# Patient Record
Sex: MALE | Race: WHITE
[De-identification: names, ages, dates, MRNs, and addresses within clinical notes are randomized per-mention and may not be internally consistent; named-entity substitution may affect disease eponyms.]

---

## 2018-11-12 ENCOUNTER — HOSPITAL ENCOUNTER (INPATIENT)
Dept: HOSPITAL 25 - ED | Age: 28
LOS: 7 days | Discharge: HOME | End: 2018-11-19
Attending: PSYCHIATRY & NEUROLOGY | Admitting: PSYCHIATRY & NEUROLOGY
Payer: COMMERCIAL

## 2018-11-12 DIAGNOSIS — R45.851: ICD-10-CM
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
## 2018-11-12 DIAGNOSIS — Z81.8: ICD-10-CM

## 2018-11-12 DIAGNOSIS — F10.24: Primary | ICD-10-CM

## 2018-11-12 DIAGNOSIS — F41.9: ICD-10-CM

## 2018-11-12 LAB
BASOPHILS # BLD AUTO: 0.1 10^3/UL (ref 0–0.2)
EOSINOPHIL # BLD AUTO: 0.1 10^3/UL (ref 0–0.6)
HCT VFR BLD AUTO: 48 % (ref 42–52)
HGB BLD-MCNC: 16.3 G/DL (ref 14–18)
LYMPHOCYTES # BLD AUTO: 1.1 10^3/UL (ref 1–4.8)
MCH RBC QN AUTO: 31 PG (ref 27–31)
MCHC RBC AUTO-ENTMCNC: 34 G/DL (ref 31–36)
MCV RBC AUTO: 92 FL (ref 80–94)
MONOCYTES # BLD AUTO: 0.9 10^3/UL (ref 0–0.8)
NEUTROPHILS # BLD AUTO: 8.7 10^3/UL (ref 1.5–7.7)
NRBC # BLD AUTO: 0 10^3/UL
NRBC BLD QL AUTO: 0.4
PLATELET # BLD AUTO: 225 10^3/UL (ref 150–450)
RBC # BLD AUTO: 5.2 10^6/UL (ref 4–5.4)
WBC # BLD AUTO: 10.9 10^3/UL (ref 3.5–10.8)

## 2018-11-12 PROCEDURE — 93005 ELECTROCARDIOGRAM TRACING: CPT

## 2018-11-12 PROCEDURE — 83036 HEMOGLOBIN GLYCOSYLATED A1C: CPT

## 2018-11-12 PROCEDURE — 90686 IIV4 VACC NO PRSV 0.5 ML IM: CPT

## 2018-11-12 PROCEDURE — 80053 COMPREHEN METABOLIC PANEL: CPT

## 2018-11-12 PROCEDURE — 84443 ASSAY THYROID STIM HORMONE: CPT

## 2018-11-12 PROCEDURE — 80329 ANALGESICS NON-OPIOID 1 OR 2: CPT

## 2018-11-12 PROCEDURE — 99284 EMERGENCY DEPT VISIT MOD MDM: CPT

## 2018-11-12 PROCEDURE — 80307 DRUG TEST PRSMV CHEM ANLYZR: CPT

## 2018-11-12 PROCEDURE — 81003 URINALYSIS AUTO W/O SCOPE: CPT

## 2018-11-12 PROCEDURE — 36415 COLL VENOUS BLD VENIPUNCTURE: CPT

## 2018-11-12 PROCEDURE — 80320 DRUG SCREEN QUANTALCOHOLS: CPT

## 2018-11-12 PROCEDURE — 90853 GROUP PSYCHOTHERAPY: CPT

## 2018-11-12 PROCEDURE — 80061 LIPID PANEL: CPT

## 2018-11-12 PROCEDURE — 85025 COMPLETE CBC W/AUTO DIFF WBC: CPT

## 2018-11-12 PROCEDURE — 99238 HOSP IP/OBS DSCHRG MGMT 30/<: CPT

## 2018-11-12 PROCEDURE — 99222 1ST HOSP IP/OBS MODERATE 55: CPT

## 2018-11-12 PROCEDURE — G0480 DRUG TEST DEF 1-7 CLASSES: HCPCS

## 2018-11-12 PROCEDURE — 99231 SBSQ HOSP IP/OBS SF/LOW 25: CPT

## 2018-11-12 NOTE — ED
Progress





- Progress Note


Progress Note: 


This patient was signed out to Dr. Alvarado, from Dr. Scanlon, pending dispo. MHE 

was done by Dr. Cheney at 2130 and the patient will be admitted with dx of 

depressive disorder. Patient understands and agrees with this plan.





- Consult/PCP


Time Called: 19:20





Re-Evaluation





- Re-Evaluation


  ** First Eval


Re-Evaluation Time: 13:40


Change: Unchanged


Comment: Pt is medically cleared for a MHE.





Course/Dx





- Diagnoses


Provider Diagnoses: 


 Depressive disorder








Discharge





- Sign-Out/Discharge


Documenting (check all that apply): Patient Departure - admit





- Discharge Plan


Condition: Stable


Disposition: PSYCHIATRIC FACILITY-[facility] [place]





- Billing Disposition and Condition


Condition: STABLE


Disposition: Psychiatric Facility [facility] [place]





- Attestation Statements


Document Initiated by Scribe: Yes


Documenting Scribe: Germain Prescott


Provider For Whom Scribe is Documenting (Include Credential): Aldo Alvarado MD


Scribe Attestation: 


Germain ANGEL, scribed for Aldo Alvarado MD on 11/16/18 at 0737. 


Scribe Documentation Reviewed: Yes


Provider Attestation: 


The documentation as recorded by the Germain barrett accurately reflects the 

service I personally performed and the decisions made by me, Aldo Alvarado MD

## 2018-11-12 NOTE — ED
Psychiatric Complaint





- HPI Summary


HPI Summary: 


Pt is a 27 y/o male who presents to the ED s/p suicide attempt. He states hes 

been going through a lot recently, including breaking up with his girlfriend. 

Pt has been drinking a lot, and states he cant sleep or work. Last night he 

drank a pint of whiskey, and tried to kill himself by hooking a hose up to the 

exhaust of his car. He states he couldnt go through with it entirely, and 

called his friend to take him to the ED for help. He tried to check into rehab 

for his alcoholism, but everything was closed because of the holiday weekend. 

Pt feels very anxious right now. He has gone through alcohol withdrawal before. 

Pt states he has been eating normally.








- History Of Current Complaint


Chief Complaint: EDMentalHealth


Time Seen by Provider: 11/12/18 13:29


Hx Obtained From: Patient


Onset/Duration: Gradual Onset, Lasting Days - Last night, Still Present


Character: Depressed, Anxious


Aggravating Factor(s): Recent Stress, Alcohol Use


Alleviating Factor(s): Nothing


Has Suicidal: Reports: Thoughts, With A Plan, Demonstrates Gesture


Has Homicidal: Denies: Thoughts





- Allergies/Home Medications


Allergies/Adverse Reactions: 


 Allergies











Allergy/AdvReac Type Severity Reaction Status Date / Time


 


No Known Allergies Allergy   Verified 11/12/18 13:26














PMH/Surg Hx/FS Hx/Imm Hx


Endocrine/Hematology History: 


   Denies: Hx Diabetes, Hx Thyroid Disease


Cardiovascular History: 


   Denies: Hx Hypertension


Respiratory History: 


   Denies: Hx Asthma, Hx Chronic Obstructive Pulmonary Disease (COPD)


GI History: 


   Denies: Hx Ulcer


Infectious Disease History: No


Infectious Disease History: 


   Denies: Hx Clostridium Difficile, Hx Hepatitis, Hx Human Immunodeficiency 

Virus (HIV), Hx of Known/Suspected MRSA, Hx Shingles, Hx Tuberculosis, Hx Known/

Suspected VRE, Hx Known/Suspected VRSA, History Other Infectious Disease, 

Traveled Outside the [place] in Last 30 Days





- Family History


Known Family History: 


   Negative: Other - alcoholism





- Social History


Alcohol Use: Daily


Hx Substance Use: Yes


Substance Use Type: Reports: Marijuana





Review of Systems


Negative: Fever


Positive: Anxious, Depressed, Other - suicide ideations and attempt


All Other Systems Reviewed And Are Negative: Yes





Physical Exam





- Summary


Physical Exam Summary: 


Appearance: Well appearing, no pain distress


Skin: warm, dry, flushing in face


Head/face: normal


Eyes: EOMI, JUDIE, no nystagmus


ENT: mucous membranes moist


Neck: supple, non-tender


Respiratory: CTA, breath sounds present


Cardiovascular: RRR, pulses symmetrical 


Abdomen: non-tender, soft


Bowel Sounds: present


Musculoskeletal: normal, strength/ROM intact


Neuro: normal, sensory motor intact, A&Ox3, fine tremor in hands


Psych: tearful, mildly agitated, SI, flat affect, doesnt maintain eye contact


Triage Information Reviewed: Yes


Vital Signs On Initial Exam: 


 Initial Vitals











Temp Pulse Resp BP Pulse Ox


 


 98.6 F   96   20   163/110   97 


 


 11/12/18 13:20  11/12/18 13:20  11/12/18 13:20  11/12/18 13:20  11/12/18 13:20











Vital Signs Reviewed: Yes





Diagnostics





- Vital Signs


 Vital Signs











  Temp Pulse Resp BP Pulse Ox


 


 11/12/18 13:20  98.6 F  96  20  163/110  97














- Laboratory


Result Diagrams: 


 11/12/18 13:50





 11/12/18 13:50


Lab Statement: Any lab studies that have been ordered have been reviewed, and 

results considered in the medical decision making process.





- EKG


  ** 13:51


Cardiac Rate: NL - 94 bpm


EKG Rhythm: Sinus Rhythm


Summary of EKG Findings: Nl axis, nl interval





Re-Evaluation





- Re-Evaluation


  ** First Eval


Re-Evaluation Time: 13:40


Change: Unchanged


Comment: Pt is medically cleared for a MHE.





Course/Dx





- Course


Course Of Treatment: Patient sobered in the ER and was cleared for mental 

health evaluation.  He was pending disposition by crisis at time of sign out to 

oncoming ER physician.  Patient likely will be admitted.





- Differential Dx/Clinical Impression


Differential Diagnosis/HQI/PQRI: Positive: Acute Psychosis, Alcohol Intoxication

, Anxiety, Bipolar Disorder, Depression, Suicidal Ideation, Suicidal Gesture


Provider Diagnosis: 


 Depression, Suicide gesture








Discharge





- Sign-Out/Discharge


Documenting (check all that apply): Sign-Out Patient


Signing out patient TO: Aldo Alvarado





- Discharge Plan


Condition: Stable


Referrals: 


No Primary Care Phys,NOPCP [Primary Care Provider] - 





- Billing Disposition and Condition


Condition: STABLE





- Attestation Statements


Document Initiated by Scribe: Yes


Documenting Scribe: Adriana Guajardo


Provider For Whom Scribe is Documenting (Include Credential): Darwin Scanlon MD


Scribe Attestation: 


I, Adriana Guajardo, scribed for Darwin Scanlon MD on 11/12/18 at 1923. 


Scribe Documentation Reviewed: Yes


Provider Attestation: 


The documentation as recorded by the scribe, Adriana Guajardo accurately reflects the 

service I personally performed and the decisions made by me, Darwin Scanlon MD

## 2018-11-13 PROCEDURE — GZHZZZZ GROUP PSYCHOTHERAPY: ICD-10-PCS | Performed by: PSYCHIATRY & NEUROLOGY

## 2018-11-13 RX ADMIN — LORAZEPAM SCH MG: 1 TABLET ORAL at 11:21

## 2018-11-13 RX ADMIN — THERA TABS SCH TAB: TAB at 11:21

## 2018-11-13 RX ADMIN — FOLIC ACID SCH MG: 1 TABLET ORAL at 11:21

## 2018-11-13 RX ADMIN — MIRTAZAPINE SCH MG: 15 TABLET, FILM COATED ORAL at 20:27

## 2018-11-13 RX ADMIN — THERA TABS SCH TAB: TAB at 08:55

## 2018-11-13 RX ADMIN — FOLIC ACID SCH MG: 1 TABLET ORAL at 08:55

## 2018-11-13 NOTE — PN
MHU: Group Therapy Note





- Service Type


Service Type: 71545 Group Psychotherapy - Cognitive Behavioral Group Therapy (

CBT):Patient attended CBT programming this morning and presented with flat 

affect that did not vary with discussion.  Although responsive to direct 

prompts to respond to questions, patient did not engage in spontaneous 

conversation.

## 2018-11-13 NOTE — HP
H&P (Free Text)


History and Physical: 





JUSTIFICATION FOR ADMISSION:


Patient presented to emergency room with suicidal ideation and plan, worsening 

depression and alcoholism, recent break up.  He requires inpatient psychiatric 

admission in order to provide treatment and stabilization as he is a danger to 

himself.  





CHIEF COMPLAINT: "I was having suicidal thoughts and was not feeling safe and 

called my friend





HISTORY OF THE PRESENT ILLNESS:


Patient is a 29 y/o male, single, living with room mates, employed, with 

history of Alcohol Use Disorder. Patient has has not been in any psychiatric or 

substance abuse treatment. Patient reports that symptoms worsened when she came 

home drunk with last week with another male friend. Patient reportedly has been 

struggling since then and working to resolve conflict and recent decision of 

breaking up with his girl friend. Patient was admitted to inpatient unit for 

worsening of his depression, decrease appetites, disturbed sleep, inability to 

concentrate, anhedonia, drinking 3-10 drinks of alcohol daily. Patient 

reportedly under the influence of alcohol planned how he was going to die from 

suicide by carbon monoxide poisoning. Patient was making arrangement to but due 

to alcohol intoxication was unable to carry it out. Patient next morning 

continue to have suicidal thoughts and was feeling unsafe hence called his 

friend. Patient's friend brought patient to E.D for evaluation.  Patient 

reports no manic symptoms. Patient reports no psychotic symptoms.   Patient 

denied any suicidal or homicidal ideation on the unit. Patient continued to 

exhibit behavior that was in control and feels safe at the hospital. Patient 

continues to report depression and anxiety and was feeling intensely worries 

this morning with his current situation.     





PAST PSYCHIATRIC HISTORY:


Patient has history of no inpatient psychiatric hospitalization.  Patient has 

history of no outpatient psychiatric treatment. Patients reports no medication 

trial.   .Patient has been in no inpatient or outpatient drug treatment.  

Patient has history of suicidal thoughts in the past during divorce about 

couple of years ago but no intent or attempt. Patient has history of no 

homicidal threat, no intent or attempt. Patient reports no history of 

aggressive and agitated behavior when decompensates. No access to firearm 

reported. Patient reports no physical, emotional or sexual abuse. 





SUBSTANCE ABUSE HISTORY:


Patient uses alcohol on a daily basis since his teenage years. Patient has 

history of DWI during teenage years. Patient has been associated and working in 

wine industry. Patient now a days has been consuming 3-10 drinks of wine daily. 

Patient last use was sunday night, a pint of wine. BAL was 124. Patient denied 

any substance abuse  but urine toxicology was positive for cannabis. Patient 

has been in no inpatient and outpatient treatment for drugs but is willing to 

follow up with inpatient rehab. 





PAST MEDICAL HISTORY: No active medical problems





ALLERGIES: NKA





FAMILY PSYCHIATRIC HISTORY:


Patient reports family history of brother with depression, who was hospitalized 

at a point in his life. But patient not aware of treatment and has limited 

communication with him. Patient reports no history of substance abuse in 

family. No reported suicide in the family.





FAMILY/PSYCHOSOCIAL HISTORY:


Patient currently lives with his room mate. Patient reports that he was in 

relationship a room mate that he broke up with recently. Patient reports 

redefining her relationship after previous break up with her. Patient reported 

being in open relationship with her before this recent break up. Patient was 

 for eight year until divorce that happened couple of yeas ago.  Patient 

has no children. Patient education level is Bachelors. Patient was raised by 

both his parents which he has some level of communication every other week over 

the phone and physically meets them every month. Patient reports memories of 

his parents fighting over issues including finances etc that he still recalls 

at times during his child hightower . Patient support system includes friends and 

families.





REVIEW OF SYSTEMS:  


Patients review of symptoms was negative for any physical complaint. Patient 

is currently on [facility] for detox from Alcohol and vitals has been improving. 

Patients ED physical exam was reviewed which is grossly normal with no active 

medical problem.





Physical Exam Summary: 


Appearance: Well appearing, no pain distress


Skin: warm, dry, flushing in face


Head/face: normal


Eyes: EOMI, JUDIE, no nystagmus


ENT: mucous membranes moist


Neck: supple, non-tender


Respiratory: CTA, breath sounds present


Cardiovascular: RRR, pulses symmetrical 


Abdomen: non-tender, soft


Bowel Sounds: present


Musculoskeletal: normal, strength/ROM intact


Neuro: normal, sensory motor intact, A&Ox3, fine tremor in hands


Psych: tearful, mildly agitated, SI, flat affect, doesnt maintain eye contact





MENTAL STATUS EXAMINATION:


Appearance: 29 y/o male, appear stated age, making intermittent eye contact, 

fair grooming and hygiene.


Behavior: cooperative


Gait: normal


Abnormal motor activity: normal


Speech: normal rate and rhythm, low tone and volume


Mood: depressed


Affect: constricted


Thought process: coherent


Thought Content:


   Suicidal/Homicidal ideation: passive si, no hi


   Delusions: none


Obsessions: none


Phobia: none


Perceptual disturbance: none


Attention: fair


Orientation: grossly intact


Concentration: limited


Memory: fair


Insight: fair


Judgment: fair


Impulse control: fair at time of interview


                                                                     


IMPRESSION: Patient with history of Alcohol Use Disorder. Patient currently 

admitted due to worsening of depression and suicidal thoughts with plan and 

alcoholism. Patient has also struggled with recent break up with his girl 

friend. Patient is a danger to self if discharged hence will be stabilized on 

inpatient unit with medication adjustments and therapy.





DIAGNOSIS: Depressive Disorder unspecified, Alcohol Use Disorder Prov: Alcohol 

Induced Depressive Disorder, Cannabis Abuse





PLAN:


Admit to [facility] on Q 15 min observation. Patient is full code.  Patient is on 

voluntary admission status


Integrate patient into the milieu


individual and group psychotherapy


MMPI and psychological consult with Dr. Tran. 


Social work consult for therapy and discharge planning


Will hold family meeting with parents to increase Data base.


Patient gave informed consent to start the following medications:


Patient to continue with WAM for alcohol withdrawal symptoms.


Patient to be started on Remeron 7.5 mg PO HS for depression.


Patient was also started on Hydroxyzine 50 mg PO Q6HRs PRN anxiety.


Will continue to monitor and f/u for improvement and side effects.





Rancho Gilbert MD


Attending Psychiatrist

## 2018-11-14 RX ADMIN — WATER SCH: 100 INJECTION, SOLUTION INTRAVENOUS at 21:01

## 2018-11-14 RX ADMIN — FOLIC ACID SCH MG: 1 TABLET ORAL at 09:09

## 2018-11-14 RX ADMIN — THERA TABS SCH TAB: TAB at 09:09

## 2018-11-14 RX ADMIN — MIRTAZAPINE SCH MG: 15 TABLET, FILM COATED ORAL at 20:57

## 2018-11-14 RX ADMIN — LORAZEPAM SCH MG: 1 TABLET ORAL at 14:51

## 2018-11-14 RX ADMIN — NICOTINE SCH: 21 PATCH TRANSDERMAL at 19:26

## 2018-11-14 NOTE — PN
Subjective





- Subjective


Date of Service: 11/14/18


Service Type: 98273 Hosp care 15 min low complexity


Subjective: 





Patient was seen by self, discussed with treatment team, chart was reviewed. 

Patient has been compliant with his medications, no reported side effects. 

Patient reports continued symptoms of depression and anxiety. Patient reported 

taking Hydroxyzine PRN for anxiety. Patient motivated to get help for alcohol 

use and depression. Patient wanted to explore options for himself regarding 

rehab treatment (inpatient or outpatient). Patient sleeping has been       

better with Remeron. Patient eating has been fair. Patient has been cooperative 

with staff. Patient behavior has been in control. Patient mood was less anxious 

and dysphoric and reports improvement in suicidal thoughts. Patient felt that 

his room mate was support and came to see him yesterday at the hospital. 

Patient has been reporting no homicidal ideation. No psychotic symptoms of 

delusions or hallucinations.





Objective





- Appearance


Appearance: Healthy Appearing


Dysmorphic Features: No


Hygiene: Normal


Grooming: Fairly Well Kept





- Behavior


Psychomotor Activities: Normal


Exhibits Abnormal Movement: No





- Attitude and Relatedness


Attitude and Relatedness: Cooperative


Eye Contact: Fair





- Speech


Quality: Unpressured


Latencies: Normal


Quantity: Appropriate





- Mood


Patient's Decription of Mood: "Anxious"





- Affect


Observed Affect: Depressed


Affect Consistent with: Dysphoria





- Thought Process


Patient's Thought Process: Coherent, Circumstantial


Thought Content: No Passive Death Wish, No Suicidal Planning, No Homicidal 

Ideation, No Paranoid Ideation





- Sensorium


Experiencing Hallucinations: No, Sensorium is Clear


Type of Hallucinations: Visual: No, Auditory: No, Command: No





- Level of Consciousness


Level of Consciousness: Alert


Orientation: Yes Intact, Yes Orientated to Time, Yes Orientated to Place, Yes 

Orientated to Person





- Impulse Control


Impulse Control: Intact





- Insight and Judgement


Insight and Judgement: Fair





- Group Participation


Particating in Group Activities: Yes





- Medication Management


Medication Management Adherence: Yes





Assessment





- Assessment


Merits Inpatient Hospitalization: For Immediate Safety, For Stabilization, For 

Discharge Planning


Inpatient DSM-V Dx: F33.8


Clinical Impression: 





Patient with history of Alcohol Use Disorder. Patient currently admitted due to 

worsening of depression and suicidal thoughts with plan and alcoholism. Patient 

has also struggled with recent break up with his girl friend. Patient is a 

danger to self if discharged hence will be stabilized on inpatient unit with 

medication adjustments and therapy.





MHU: Problem List





- Patient Problems


(1) Alcohol use disorder


Current Visit: Yes   Status: Acute   Code(s): AUK2235 -    SNOMED Code(s): 

91275046


   





(2) Depressive disorder


Current Visit: Yes   Status: Acute   Code(s): F32.9 - MAJOR DEPRESSIVE DISORDER

, SINGLE EPISODE, UNSPECIFIED   SNOMED Code(s): 71593077


   





Plan





- Plan


Treatment Plan: 


Name: GYPSY RODRIGUEZ                        


YOB: 1990                        


[id number]


[id number]








- Patient continues to be hospitalized due to recent suicidal thoughts with plan

, depression, anxiety and alcohol abuse.


- Patient's medications were continued with Remeron 7.5 mg at Bedtime, 

Hydroxyzine was kept at 50 mg PO Q6HRS PRN anxiety. [facility] protocol for alcohol 

withdrawal to be continued.


- Ordered Nicotine patch as per patient request and experience nicotine 

withdrawal.


- Patient will be monitored for improvement and side effects. Risk and benefits 

were discussed.


- Patient was encouraged to continue his participation in the milieu, group and 

individual therapy.


Medications: 


 Current Medications





Acetaminophen (Tylenol Tab*)  650 mg PO Q4H PRN


   PRN Reason: PAIN or TEMP > 101 F


Al Hydrox/Mg Hydrox/Simethicone (Maalox Plus*)  30 ml PO Q4H PRN


   PRN Reason: INDIGESTION


Folic Acid (Folvite Tab*)  1 mg PO DAILY [facility]


   Last Admin: 11/14/18 09:09 Dose:  1 mg


Hydroxyzine HCl (Atarax Tab*)  50 mg PO Q6H PRN


   PRN Reason: ANXIETY


   Last Admin: 11/14/18 09:10 Dose:  50 mg


Lorazepam (Ativan Tab(*))  0 - 6 mg PO .PER [facility] PARAMETERS [facility]; Protocol


   Last Admin: 11/13/18 11:21 Dose:  3 mg


Lorazepam (Ativan Inj*)  0 - 6 mg IM .PER [facility] PROTOCOL [facility]; Protocol


Mirtazapine (Remeron Tab*)  7.5 mg PO BEDTIME [facility]


   Last Admin: 11/13/18 20:27 Dose:  7.5 mg


Multivitamins (Theragran Tab*)  1 tab PO DAILY [facility]


   Last Admin: 11/14/18 09:09 Dose:  1 tab


Nicotine (Nicotine Inhaler*)  10 mg INH Q2H PRN


   PRN Reason: CRAVING


Nicotine (Nicotine Patch 21 Mg/24 Hr*)  1 patch TRANSDERM DAILY [facility]


Pharmacy Profile Note (Nicotine Patch Removal Note*)  1 note FOLLOW UP [address] [facility]

## 2018-11-14 NOTE — PN
MHU: Group Therapy Note





- Service Type


Service Type: 72056 Group Psychotherapy - Cognitive Behavioral Group Therapy (

CBT):Patient attended CBT programming this morning and presented with flat 

affect that did not vary with discussion.  Although responsive to direct 

prompts to respond to questions, patient did not engage in spontaneous 

conversation.

## 2018-11-15 RX ADMIN — FOLIC ACID SCH MG: 1 TABLET ORAL at 09:22

## 2018-11-15 RX ADMIN — NICOTINE SCH: 21 PATCH TRANSDERMAL at 09:21

## 2018-11-15 RX ADMIN — WATER SCH: 100 INJECTION, SOLUTION INTRAVENOUS at 20:17

## 2018-11-15 RX ADMIN — THERA TABS SCH TAB: TAB at 09:22

## 2018-11-15 RX ADMIN — MIRTAZAPINE SCH MG: 15 TABLET, FILM COATED ORAL at 20:17

## 2018-11-15 NOTE — PN
Subjective





- Subjective


Date of Service: 11/15/18


Service Type: 06351 Hosp care 15 min low complexity


Subjective: 





Patient was seen by self, discussed with treatment team, chart was reviewed. 

Patient has been compliant with his medications, no reported side effects. 

Patient reports continued symptoms of depression and anxiety with some 

improvement. Patient is recovering fine with alcohol withdrawal. Patient 

reported taking Hydroxyzine PRN for anxiety. Patient continues to show 

motivation to get help for alcohol use and depression. Patient has been 

interested in inpatient rehab after knowing about available opitions. Patient 

sleeping has been better with Remeron. Patient eating has been fair. Patient 

has been cooperative with staff. Patient behavior has been in control. Patient 

mood was less anxious and dysphoric and reports. Patient has been reporting no 

homicidal ideation or suicidal ideation. No psychotic symptoms of delusions or 

hallucinations. Patient was worried about logistics related to him going to 

inpatient rehab, his housing, insurance, work etc for which he was provided 

education and support.





Objective





- Appearance


Appearance: Healthy Appearing


Dysmorphic Features: No


Hygiene: Normal


Grooming: Fairly Well Kept





- Behavior


Psychomotor Activities: Normal


Exhibits Abnormal Movement: No





- Attitude and Relatedness


Attitude and Relatedness: Cooperative


Eye Contact: Fair





- Speech


Quality: Unpressured


Latencies: Normal


Quantity: Appropriate





- Mood


Patient's Decription of Mood: "Anxious"





- Affect


Observed Affect: Depressed


Affect Consistent with: Dysphoria





- Thought Process


Patient's Thought Process: Coherent, Goal Directed


Thought Content: No Passive Death Wish, No Suicidal Planning, No Homicidal 

Ideation, No Paranoid Ideation





- Sensorium


Experiencing Hallucinations: No, Sensorium is Clear


Type of Hallucinations: Visual: No, Auditory: No, Command: No





- Level of Consciousness


Level of Consciousness: Alert


Orientation: Yes Intact, Yes Orientated to Time, Yes Orientated to Place, Yes 

Orientated to Person





- Impulse Control


Impulse Control: Intact





- Insight and Judgement


Insight and Judgement: Fair





- Group Participation


Particating in Group Activities: Yes





- Medication Management


Medication Management Adherence: Yes





Assessment





- Assessment


Merits Inpatient Hospitalization: For Immediate Safety, For Stabilization, For 

Discharge Planning


Inpatient DSM-V Dx: F33.8


Clinical Impression: 





Patient with history of Alcohol Use Disorder. Patient currently admitted due to 

worsening of depression and suicidal thoughts with plan and alcoholism. Patient 

has also struggled with recent break up with his girl friend. Patient is a 

danger to self if discharged hence will be stabilized on inpatient unit with 

medication adjustments and therapy.





MHU: Problem List





- Patient Problems


(1) Alcohol use disorder


Current Visit: Yes   Status: Acute   Code(s): ZQU3363 -    SNOMED Code(s): 

32862069


   





(2) Depressive disorder


Current Visit: Yes   Status: Acute   Code(s): F32.9 - MAJOR DEPRESSIVE DISORDER

, SINGLE EPISODE, UNSPECIFIED   SNOMED Code(s): 29636365


   





Plan





- Plan


Treatment Plan: 


Name: GYPSY RODRIGUEZ                        


YOB: 1990                        


[id number]


[id number]








- Patient continues to be hospitalized due to recent suicidal thoughts with plan

, depression, anxiety and alcohol abuse.


- Patient's medications were adjusted with increment in Remeron to 15 mg at 

Bedtime, Hydroxyzine was kept at 50 mg PO Q6HRS PRN anxiety. [facility] protocol for 

alcohol withdrawal to be continued. Patient also in the process of being 

referred to rehab.


- Ordered Nicotine patch as per patient request and experience nicotine 

withdrawal.


- Patient will be monitored for improvement and side effects. Risk and benefits 

were discussed.


- Patient was encouraged to continue his participation in the milieu, group and 

individual therapy.


Medications: 


 Current Medications





Acetaminophen (Tylenol Tab*)  650 mg PO Q4H PRN


   PRN Reason: PAIN or TEMP > 101 F


Al Hydrox/Mg Hydrox/Simethicone (Maalox Plus*)  30 ml PO Q4H PRN


   PRN Reason: INDIGESTION


Folic Acid (Folvite Tab*)  1 mg PO DAILY [facility]


   Last Admin: 11/15/18 09:22 Dose:  1 mg


Hydroxyzine HCl (Atarax Tab*)  50 mg PO Q6H PRN


   PRN Reason: ANXIETY


   Last Admin: 11/14/18 09:10 Dose:  50 mg


Lorazepam (Ativan Tab(*))  0 - 6 mg PO .PER [facility] PARAMETERS [facility]; Protocol


   Last Admin: 11/14/18 14:51 Dose:  2 mg


Lorazepam (Ativan Inj*)  0 - 6 mg IM .PER [facility] PROTOCOL [facility]; Protocol


Mirtazapine (Remeron Tab*)  15 mg PO BEDTIME [facility]


Multivitamins (Theragran Tab*)  1 tab PO DAILY [facility]


   Last Admin: 11/15/18 09:22 Dose:  1 tab


Nicotine (Nicotine Inhaler*)  10 mg INH Q2H PRN


   PRN Reason: CRAVING


Nicotine (Nicotine Patch 21 Mg/24 Hr*)  1 patch TRANSDERM DAILY [facility]


   Last Admin: 11/15/18 09:21 Dose:  Not Given


Pharmacy Profile Note (Nicotine Patch Removal Note*)  1 note FOLLOW UP 2100 [facility]


   Last Admin: 11/14/18 21:01 Dose:  Not Given


Thiamine HCl (Vitamin B-1 Tab*)  100 mg PO DAILY [facility]

## 2018-11-16 RX ADMIN — FOLIC ACID SCH MG: 1 TABLET ORAL at 08:42

## 2018-11-16 RX ADMIN — Medication SCH MG: at 08:42

## 2018-11-16 RX ADMIN — WATER SCH: 100 INJECTION, SOLUTION INTRAVENOUS at 20:15

## 2018-11-16 RX ADMIN — MIRTAZAPINE SCH: 15 TABLET, FILM COATED ORAL at 20:13

## 2018-11-16 RX ADMIN — NICOTINE SCH: 21 PATCH TRANSDERMAL at 08:42

## 2018-11-16 RX ADMIN — THERA TABS SCH TAB: TAB at 08:42

## 2018-11-17 RX ADMIN — Medication SCH MG: at 09:07

## 2018-11-17 RX ADMIN — WATER SCH: 100 INJECTION, SOLUTION INTRAVENOUS at 21:20

## 2018-11-17 RX ADMIN — THERA TABS SCH TAB: TAB at 09:07

## 2018-11-17 RX ADMIN — NICOTINE SCH: 21 PATCH TRANSDERMAL at 09:08

## 2018-11-17 RX ADMIN — MIRTAZAPINE SCH: 15 TABLET, FILM COATED ORAL at 21:20

## 2018-11-17 RX ADMIN — FOLIC ACID SCH MG: 1 TABLET ORAL at 09:07

## 2018-11-17 NOTE — PN
Subjective





- Subjective


Date of Service: 11/17/18


Subjective: 


Patient is euthymic in mood and affect.  He reports that he requested discharge 

on Monday.  He is hopeful to be discharged and then admitted to [facility].  

This plan has been set in motion, although there is no current available bed at 

this date as of Friday according to the .  Patient states that his 

room mates have been a sources of support with regards to admission of 

alcoholism, but that he has not told his parents feeling that divulging this 

will be met with disappointment.  He feels that if [facility] is an option that 

he would like a therapist.  Alcoholics Anonymous and a Sponsor and outpatient 

Substance Abuse Treatment was encouraged strongly.  He readily agrees that his 

is something he would like to pursue if [facility] does not come to fruition. 

He reports attending groups, reporting no SI/HI and states that his mood has 

greatly improved.  Smiling and conversant during interview. Reports no 

withdrawal complaints, no side effects from medications








Objective





- Appearance


Appearance: Well Developed/Nourished


Dysmorphic Features: No


Hygiene: Normal


Grooming: Well Kept





- Behavior


Psychomotor Activities: Normal


Exhibits Abnormal Movement: No





- Attitude and Relatedness


Attitude and Relatedness: Cooperative


Eye Contact: Good





- Speech


Quality: Unpressured


Latencies: Normal


Quantity: Appropriate





- Mood


Patient's Decription of Mood: "Good"





- Affect


Observed Affect: Good


Affect Consistent with: Euthymia





- Thought Process


Patient's Thought Process: Coherent


Thought Content: No Passive Death Wish, No Suicidal Planning, No Homicidal 

Ideation, No Paranoid Ideation





- Sensorium


Experiencing Hallucinations: No, Sensorium is Clear


Type of Hallucinations: Visual: No, Auditory: No, Command: No





- Level of Consciousness


Level of Consciousness: Alert


Orientation: Yes Intact, Yes Orientated to Time, Yes Orientated to Place, Yes 

Orientated to Person





- Impulse Control


Impulse Control: Intact





- Insight and Judgement


Insight and Judgement: Fair





- Group Participation


Particating in Group Activities: Yes


Group Participation Comments: Attending Groups





- Medication Management


Medication Management Adherence: Yes





Assessment





- Assessment


Merits Inpatient Hospitalization: For Ongoing Evaluation


Inpatient DSM-V Dx: F33.8


Clinical Impression: 


Patient is requesting discharge on Monday and is hoping to be participating in 

[facility].  He did not have a secondary plan 

to this.  He reports that his roommates are aware that he is in the hospital 

and reasons, further reporting that his good friend Bernardo has been a great 

support to him while hospitalized.  He is future oriented and states that he 

will be looking  to change his job to avoid the opportunities for alcohol 

consumption as his last job was an  for a [employer].  He is 

open to Outpatient Substance Abuse Treatment and Outpatient Mental Health 

Services, Alcoholics Anonymous and a Sponsor. 








Patient with history of Alcohol Use Disorder. Patient currently admitted due to 

worsening of depression and suicidal thoughts with plan and alcoholism. Patient 

has also struggled with recent break up with his girl friend. Patient is a 

danger to self if discharged hence will be stabilized on inpatient unit with 

medication adjustments and therapy.





Plan





- Plan


Treatment Plan: 


Name: GYPSY RODRIGUEZ                        


YOB: 1990                        


[id number]


[id number]








- Patient continues to be hospitalized due to recent suicidal thoughts with plan

, depression, anxiety and alcohol abuse.


- Patient's medications were continued with Remeron 15 mg at Bedtime, 

Hydroxyzine was kept at 50 mg PO Q6HRS PRN anxiety. [facility] protocol was 

discontinued. Patient also in the process of being referred to rehab.


- Ordered Nicotine patch as per patient request and experience nicotine 

withdrawal.


- Patient will be monitored for improvement and side effects. Risk and benefits 

were discussed.


- Patient was encouraged to continue his participation in the milieu, group and 

individual therapy.


Medications: 


 Current Medications





Acetaminophen (Tylenol Tab*)  650 mg PO Q4H PRN


   PRN Reason: PAIN or TEMP > 101 F


Al Hydrox/Mg Hydrox/Simethicone (Maalox Plus*)  30 ml PO Q4H PRN


   PRN Reason: INDIGESTION


Folic Acid (Folvite Tab*)  1 mg PO DAILY [facility]


   Last Admin: 11/17/18 09:07 Dose:  1 mg


Hydroxyzine HCl (Atarax Tab*)  50 mg PO Q6H PRN


   PRN Reason: ANXIETY


   Last Admin: 11/14/18 09:10 Dose:  50 mg


Mirtazapine (Remeron Tab*)  15 mg PO BEDTIME [facility]


   Last Admin: 11/16/18 20:13 Dose:  Not Given


Multivitamins (Theragran Tab*)  1 tab PO DAILY [facility]


   Last Admin: 11/17/18 09:07 Dose:  1 tab


Nicotine (Nicotine Inhaler*)  10 mg INH Q2H PRN


   PRN Reason: CRAVING


Nicotine (Nicotine Patch 21 Mg/24 Hr*)  1 patch TRANSDERM DAILY [facility]


   Last Admin: 11/17/18 09:08 Dose:  Not Given


Pharmacy Profile Note (Nicotine Patch Removal Note*)  1 note FOLLOW UP 2100 [facility]


   Last Admin: 11/16/18 20:15 Dose:  Not Given


Thiamine HCl (Vitamin B-1 Tab*)  100 mg PO DAILY [facility]


   Last Admin: 11/17/18 09:07 Dose:  100 mg

## 2018-11-18 RX ADMIN — WATER SCH: 100 INJECTION, SOLUTION INTRAVENOUS at 20:12

## 2018-11-18 RX ADMIN — THERA TABS SCH TAB: TAB at 08:51

## 2018-11-18 RX ADMIN — Medication SCH MG: at 08:51

## 2018-11-18 RX ADMIN — NICOTINE SCH: 21 PATCH TRANSDERMAL at 08:51

## 2018-11-18 RX ADMIN — MIRTAZAPINE SCH: 15 TABLET, FILM COATED ORAL at 20:11

## 2018-11-18 RX ADMIN — FOLIC ACID SCH MG: 1 TABLET ORAL at 08:51

## 2018-11-19 VITALS — SYSTOLIC BLOOD PRESSURE: 133 MMHG | DIASTOLIC BLOOD PRESSURE: 77 MMHG

## 2018-11-19 RX ADMIN — FOLIC ACID SCH MG: 1 TABLET ORAL at 09:16

## 2018-11-19 RX ADMIN — THERA TABS SCH TAB: TAB at 09:16

## 2018-11-19 RX ADMIN — NICOTINE SCH: 21 PATCH TRANSDERMAL at 09:15

## 2018-11-19 RX ADMIN — Medication SCH MG: at 09:16

## 2018-11-19 NOTE — DS
Subjective





- Subjective


Service Types: 62133 [facility] Day Mgmt complex over 30 min


Discharge Date: 11/19/18


Subjective: 





JUSTIFICATION FOR ADMISSION:


Patient presented to emergency room with suicidal ideation and plan, worsening 

depression and alcoholism, recent break up.  He requires inpatient psychiatric 

admission in order to provide treatment and stabilization as he is a danger to 

himself.  





CHIEF COMPLAINT: "I was having suicidal thoughts and was not feeling safe and 

called my friend





HISTORY OF THE PRESENT ILLNESS:


Patient is a 27 y/o male, single, living with room mates, employed, with 

history of Alcohol Use Disorder. Patient has has not been in any psychiatric or 

substance abuse treatment. Patient reports that symptoms worsened when she came 

home drunk with last week with another male friend. Patient reportedly has been 

struggling since then and working to resolve conflict and recent decision of 

breaking up with his girl friend. Patient was admitted to inpatient unit for 

worsening of his depression, decrease appetites, disturbed sleep, inability to 

concentrate, anhedonia, drinking 3-10 drinks of alcohol daily. Patient 

reportedly under the influence of alcohol planned how he was going to die from 

suicide by carbon monoxide poisoning. Patient was making arrangement to but due 

to alcohol intoxication was unable to carry it out. Patient next morning 

continue to have suicidal thoughts and was feeling unsafe hence called his 

friend. Patient's friend brought patient to E.D for evaluation.  Patient 

reports no manic symptoms. Patient reports no psychotic symptoms.   Patient 

denied any suicidal or homicidal ideation on the unit. Patient continued to 

exhibit behavior that was in control and feels safe at the hospital. Patient 

continues to report depression and anxiety and was feeling intensely worries 

this morning with his current situation.     





PAST PSYCHIATRIC HISTORY:


Patient has history of no inpatient psychiatric hospitalization.  Patient has 

history of no outpatient psychiatric treatment. Patients reports no medication 

trial.   .Patient has been in no inpatient or outpatient drug treatment.  

Patient has history of suicidal thoughts in the past during divorce about 

couple of years ago but no intent or attempt. Patient has history of no 

homicidal threat, no intent or attempt. Patient reports no history of 

aggressive and agitated behavior when decompensates. No access to firearm 

reported. Patient reports no physical, emotional or sexual abuse. 





SUBSTANCE ABUSE HISTORY:


Patient uses alcohol on a daily basis since his teenage years. Patient has 

history of DWI during teenage years. Patient has been associated and working in 

wine industry. Patient now a days has been consuming 3-10 drinks of wine daily. 

Patient last use was sunday night, a pint of wine. BAL was 124. Patient denied 

any substance abuse  but urine toxicology was positive for cannabis. Patient 

has been in no inpatient and outpatient treatment for drugs but is willing to 

follow up with inpatient rehab. 





PAST MEDICAL HISTORY: No active medical problems





ALLERGIES: NKA





FAMILY PSYCHIATRIC HISTORY:


Patient reports family history of brother with depression, who was hospitalized 

at a point in his life. But patient not aware of treatment and has limited 

communication with him. Patient reports no history of substance abuse in 

family. No reported suicide in the family.





FAMILY/PSYCHOSOCIAL HISTORY:


Patient currently lives with his room mate. Patient reports that he was in 

relationship a room mate that he broke up with recently. Patient reports 

redefining her relationship after previous break up with her. Patient reported 

being in open relationship with her before this recent break up. Patient was 

 for eight year until divorce that happened couple of yeas ago.  Patient 

has no children. Patient education level is Bachelors. Patient was raised by 

both his parents which he has some level of communication every other week over 

the phone and physically meets them every month. Patient reports memories of 

his parents fighting over issues including finances etc that he still recalls 

at times during his child hightower . Patient support system includes friends and 

families.





REVIEW OF SYSTEMS:  


Patients review of symptoms was negative for any physical complaint. Patient 

is currently on WA for detox from Alcohol and vitals has been improving. 

Patients ED physical exam was reviewed which is grossly normal with no active 

medical problem.





Physical Exam Summary: 


Appearance: Well appearing, no pain distress


Skin: warm, dry, flushing in face


Head/face: normal


Eyes: EOMI, JUDIE, no nystagmus


ENT: mucous membranes moist


Neck: supple, non-tender


Respiratory: CTA, breath sounds present


Cardiovascular: RRR, pulses symmetrical 


Abdomen: non-tender, soft


Bowel Sounds: present


Musculoskeletal: normal, strength/ROM intact


Neuro: normal, sensory motor intact, A&Ox3, fine tremor in hands


Psych: tearful, mildly agitated, SI, flat affect, doesnt maintain eye contact





MENTAL STATUS EXAMINATION ON ADMISSION:


Appearance: 27 y/o male, appear stated age, making intermittent eye contact, 

fair grooming and hygiene.


Behavior: cooperative


Gait: normal


Abnormal motor activity: normal


Speech: normal rate and rhythm, low tone and volume


Mood: depressed


Affect: constricted


Thought process: coherent


Thought Content:


   Suicidal/Homicidal ideation: passive si, no hi


   Delusions: none


Obsessions: none


Phobia: none


Perceptual disturbance: none


Attention: fair


Orientation: grossly intact


Concentration: limited


Memory: fair


Insight: fair


Judgment: fair


Impulse control: fair at time of interview


                                                                     


DIAGNOSIS ON ADMISSION: Depressive Disorder unspecified, Alcohol Use Disorder 

Prov: Alcohol Induced Depressive Disorder, Cannabis Abuse





DIAGNOSIS ON DISCHARGE: Alcohol Use Disorder, Alcohol Induced Depressive 

Disorder, Cannabis Abuse





Objective





- Appearance


Appearance: Healthy Appearing


Dysmorphic Features: No


Hygiene: Normal


Grooming: Fairly Well Kept





- Behavior


Psychomotor Activities: Normal


Exhibits Abnormal Movement: No





- Attitude and Relatedness


Attitude and Relatedness: Cooperative


Eye Contact: Fair





- Speech


Quality: Unpressured


Latencies: Normal


Quantity: Appropriate





- Mood


Patient's Decription of Mood: "Anxious"





- Affect


Observed Affect: Fair


Affect Consistent with: Euthymia - somewhat





- Thought Process


Patient's Thought Process: Coherent


Thought Content: No Passive Death Wish, No Suicidal Planning, No Homicidal 

Ideation, No Paranoid Ideation





- Sensorium


Experiencing Hallucinations: No, Sensorium is Clear


Type of Hallucinations: Visual: No, Auditory: No, Command: No





- Level of Consciousness


Level of Consciousness: Alert


Orientation: Yes Intact, Yes Orientated to Time, Yes Orientated to Place, Yes 

Orientated to Person





- Impulse Control


Impulse Control: Intact





- Insight and Judgement


Insight and Judgement: Fair





- Group Participation


Particating in Group Activities: Yes





- Medication Management


Medication Management Adherence: Yes





Treatment Course & Assessment


Clinical Course & Impression: 





Patient is 27 y/o male with history of Alcohol Use Disorder. Patient currently 

admitted due to worsening of depression and suicidal thoughts with plan and 

alcoholism. Patient has also struggled with recent break up with his girl 

friend. Patient was a danger to self if discharged hence will be stabilized on 

inpatient unit with medication adjustments and therapy.





Patient was admitted to [facility] on Q 15 min observation. Patient is full code.  

Patient was on voluntary admission status. Integrated patient into the milieu


individual and group psychotherapy. Social work consulted for therapy and 

discharge planning. Patient gave informed consent to start patient on WAM for 

alcohol withdrawal symptoms. Patient was also started on Remeron 7.5 mg PO HS 

for depression. Patient was also started on Hydroxyzine 50 mg PO Q6HRS PRN 

anxiety. Will continue to monitor and follow up for improvement and side 

effects.





Patient was compliant with his medications, no reported side effects. Patient 

reports continued symptoms of depression and anxiety. Patient reported taking 

Hydroxyzine PRN for anxiety. Patient motivated to get help for alcohol use and 

depression. Patient wanted to explore options for himself regarding rehab 

treatment (inpatient or outpatient). Patient sleeping was better with Remeron.  

Patient mood was less anxious and dysphoric and reports improvement in suicidal 

thoughts. Patient felt that his room mate was supportive and came to see him at 

the hospital and is also willing to help patient and remove alcohol from the 

house that they are sharing. Patient also setting up goals to switch his work 

and prevent getting exposed to alcohol that will result in relapse. 





Patient was recovering fine with alcohol withdrawal. was motivated to get help 

for alcohol use and depression. Patient was interested in inpatient rehab after 

knowing about available opitions but was worried about logistics related to him 

going to inpatient rehab, his housing, insurance, work etc for which he was 

provided education and support. Patient's medications were adjusted with 

increment in Remeron to 15 mg at Bedtime, Hydroxyzine was kept at 50 mg PO 

Q6HRS PRN anxiety. [facility] protocol for alcohol withdrawal to be continued. Patient 

also in the process of being referred to in patient rehab.





Patient was attending therapy during this hospitalization and also AA meeting 

which he found very helpful. Patient learned coping strategies to help distress 

and was using it effectively. Patient behavior was in good control was safe on 

all checks. Patient improved and mood was more stable. Patient reported not 

taking remeron and other medications after acute alcohol withdrawals had 

resolved. Patient was sleeping fine and eating appropriately. Patient was 

offered medications to help with alcohol cravings and urges. Patient reported 

being strong in his will power but will resume medication management if having 

difficulty with cravings during outpatient treatment process for alcohol and 

cannabis abuse. Patient was not interested in inpatient rehab any more despite 

knowing that he has a bed available tomorrow at [facility]. Patient was focused 

on his discharge and seeking outpatient rehab treatment and AA meeting.





Patient mood was stable, less anxious, not suicidal/homicidal, not manic or 

psychotic. Patient signed 72 hours and wanted to be discharged today. Patient 

was willing to follow up outpatient rehab. As patient was doing fine and was 

not a danger to self and others. Patient caring for himself well. Hence after 

discussing with team patient was discharged with plan to follow up outpatient 

substance/alcohol use treatment and AA/NA Meetings. 


Merits Inpatient Hospitalization: No


Clear for Discharge: Adequate Clinical Respons, Acceptable Safety Profile, Low 

Utility of Inpt Care


Inpatient DSM-V Dx: F33.8





Discharge Planning





- Discharge Planning


Recommendations for Continuing Care: Substance Abuse Counseling - and AA/NA 

meeting


Medications: 


 Discharge Medications


Folic Acid (Folvite Tab*)  1 mg PO DAILY [facility]


   Last Admin: 11/19/18 09:16 Dose:  1 mg


Multivitamins (Theragran Tab*)  1 tab PO DAILY [facility]


   Last Admin: 11/19/18 09:16 Dose:  1 tab


Thiamine HCl (Vitamin B-1 Tab*)  100 mg PO DAILY [facility]


   Last Admin: 11/19/18 09:16 Dose:  100 mg








Discharge Planning: 


Prescriptions provided for discharge                  [x] Yes   [] No   





Follow up care details as per social work arrangements.


Patient response to discharge plan:   


                                                                 [x] eager for 

discharge


                                    [] agreeable with discharge plan


                                  [] ambivalent about discharge


                                   [] disagrees with discharge today

## 2022-02-11 NOTE — PN
Subjective





- Subjective


Date of Service: 11/16/18


Service Type: 00857 [facility] care 15 min low complexity


Subjective: 





Patient was seen by self, discussed with treatment team, chart was reviewed. 

Patient has been compliant with his medications, no reported side effects. 

Patient reports some improvement in symptoms of depression and anxiety. Patient 

is recovering fine with alcohol withdrawal and did not score on WAM. Patient 

reported taking Hydroxyzine PRN for anxiety. Patient continues to show 

motivation to get help for alcohol use and depression. Patient continues to 

show interest in inpatient rehab. Patient sleeping has been better with 

Remeron. Patient eating has been fair. Patient has been cooperative with staff. 

Patient behavior has been in control. Patient mood was less anxious but 

dysphoric and has been learning skills to cope with psychosocial stress. 

Patient has been reporting no homicidal ideation or suicidal ideation. No 

psychotic symptoms of delusions or hallucinations.





Objective





- Appearance


Appearance: Healthy Appearing


Dysmorphic Features: No


Hygiene: Normal


Grooming: Fairly Well Kept





- Behavior


Psychomotor Activities: Normal


Exhibits Abnormal Movement: No





- Attitude and Relatedness


Attitude and Relatedness: Cooperative


Eye Contact: Fair





- Speech


Quality: Unpressured


Latencies: Normal


Quantity: Appropriate





- Mood


Patient's Decription of Mood: "Sad"





- Affect


Observed Affect: Depressed


Affect Consistent with: Dysphoria





- Thought Process


Patient's Thought Process: Coherent


Thought Content: No Passive Death Wish, No Suicidal Planning, No Homicidal 

Ideation, No Paranoid Ideation





- Sensorium


Experiencing Hallucinations: No, Sensorium is Clear


Type of Hallucinations: Visual: No, Auditory: No, Command: No





- Level of Consciousness


Level of Consciousness: Alert


Orientation: Yes Intact, Yes Orientated to Time, Yes Orientated to Place, Yes 

Orientated to Person





- Impulse Control


Impulse Control: Intact





- Insight and Judgement


Insight and Judgement: Fair





- Group Participation


Particating in Group Activities: Yes





- Medication Management


Medication Management Adherence: Yes





Assessment





- Assessment


Merits Inpatient Hospitalization: For Immediate Safety, For Stabilization, For 

Discharge Planning


Inpatient DSM-V Dx: F33.8


Clinical Impression: 





Patient with history of Alcohol Use Disorder. Patient currently admitted due to 

worsening of depression and suicidal thoughts with plan and alcoholism. Patient 

has also struggled with recent break up with his girl friend. Patient is a 

danger to self if discharged hence will be stabilized on inpatient unit with 

medication adjustments and therapy.





MHU: Problem List





- Patient Problems


(1) Alcohol use disorder


Current Visit: Yes   Status: Acute   Code(s): PQX5981 -    SNOMED Code(s): 

32715594


   





(2) Depressive disorder


Current Visit: Yes   Status: Acute   Code(s): F32.9 - MAJOR DEPRESSIVE DISORDER

, SINGLE EPISODE, UNSPECIFIED   SNOMED Code(s): 38964029


   





Plan





- Plan


Treatment Plan: 


Name: GYPSY RODRIGUEZ                        


YOB: 1990                        


[id number]


[id number]








- Patient continues to be hospitalized due to recent suicidal thoughts with plan

, depression, anxiety and alcohol abuse.


- Patient's medications were continued with Remeron 15 mg at Bedtime, 

Hydroxyzine was kept at 50 mg PO Q6HRS PRN anxiety. WA protocol was 

discontinued. Patient also in the process of being referred to rehab.


- Ordered Nicotine patch as per patient request and experience nicotine 

withdrawal.


- Patient will be monitored for improvement and side effects. Risk and benefits 

were discussed.


- Patient was encouraged to continue his participation in the milieu, group and 

individual therapy.


Medications: 


 Current Medications





Acetaminophen (Tylenol Tab*)  650 mg PO Q4H PRN


   PRN Reason: PAIN or TEMP > 101 F


Al Hydrox/Mg Hydrox/Simethicone (Maalox Plus*)  30 ml PO Q4H PRN


   PRN Reason: INDIGESTION


Folic Acid (Folvite Tab*)  1 mg PO DAILY [facility]


   Last Admin: 11/16/18 08:42 Dose:  1 mg


Hydroxyzine HCl (Atarax Tab*)  50 mg PO Q6H PRN


   PRN Reason: ANXIETY


   Last Admin: 11/14/18 09:10 Dose:  50 mg


Lorazepam (Ativan Tab(*))  0 - 6 mg PO .PER [facility] PARAMETERS [facility]; Protocol


   Last Admin: 11/14/18 14:51 Dose:  2 mg


Lorazepam (Ativan Inj*)  0 - 6 mg IM .PER [facility] PROTOCOL [facility]; Protocol


Mirtazapine (Remeron Tab*)  15 mg PO BEDTIME [facility]


   Last Admin: 11/15/18 20:17 Dose:  15 mg


Multivitamins (Theragran Tab*)  1 tab PO DAILY [facility]


   Last Admin: 11/16/18 08:42 Dose:  1 tab


Nicotine (Nicotine Inhaler*)  10 mg INH Q2H PRN


   PRN Reason: CRAVING


Nicotine (Nicotine Patch 21 Mg/24 Hr*)  1 patch TRANSDERM DAILY [facility]


   Last Admin: 11/16/18 08:42 Dose:  Not Given


Pharmacy Profile Note (Nicotine Patch Removal Note*)  1 note FOLLOW UP 2100 [facility]


   Last Admin: 11/15/18 20:17 Dose:  Not Given


Thiamine HCl (Vitamin B-1 Tab*)  100 mg PO DAILY [facility]


   Last Admin: 11/16/18 08:42 Dose:  100 mg general